# Patient Record
Sex: FEMALE | Race: WHITE | NOT HISPANIC OR LATINO | URBAN - METROPOLITAN AREA
[De-identification: names, ages, dates, MRNs, and addresses within clinical notes are randomized per-mention and may not be internally consistent; named-entity substitution may affect disease eponyms.]

---

## 2021-10-06 ENCOUNTER — EMERGENCY (EMERGENCY)
Facility: HOSPITAL | Age: 21
LOS: 0 days | Discharge: HOME | End: 2021-10-06
Attending: EMERGENCY MEDICINE
Payer: COMMERCIAL

## 2021-10-06 VITALS
HEART RATE: 108 BPM | TEMPERATURE: 98 F | SYSTOLIC BLOOD PRESSURE: 144 MMHG | OXYGEN SATURATION: 100 % | DIASTOLIC BLOOD PRESSURE: 95 MMHG | RESPIRATION RATE: 18 BRPM

## 2021-10-06 VITALS — WEIGHT: 139.99 LBS | HEIGHT: 69 IN

## 2021-10-06 DIAGNOSIS — R68.83 CHILLS (WITHOUT FEVER): ICD-10-CM

## 2021-10-06 DIAGNOSIS — U07.1 COVID-19: ICD-10-CM

## 2021-10-06 DIAGNOSIS — R68.89 OTHER GENERAL SYMPTOMS AND SIGNS: ICD-10-CM

## 2021-10-06 PROCEDURE — 99282 EMERGENCY DEPT VISIT SF MDM: CPT

## 2021-10-06 PROCEDURE — 99284 EMERGENCY DEPT VISIT MOD MDM: CPT

## 2021-10-06 NOTE — ED STATDOCS - PROGRESS NOTE DETAILS
22 yo female with a PMH of +covid last week presents with feeling cold and having a cold nose when she breaths in. Was seen at urgent care and sent in for further evaluation. -Rubin Stringer PA-C

## 2021-10-06 NOTE — ED STATDOCS - OBJECTIVE STATEMENT
22 y/o female with no significant PMHx presents to the ED c/o "feeling cold." Pt states the air coming in her nose and mouth are cold. Pt started with COVID symptoms 8 days ago and tested COVID + that day.  Pt starting to feel better. No CP, SOB. Due to cold feeling, pt was seen at urgent cart[facility] and was referred to the ED for evaluation. No other complaints at this time.

## 2021-10-06 NOTE — ED STATDOCS - ATTENDING CONTRIBUTION TO CARE
I,Brian Moreira MD,  performed the initial face to face bedside interview with this patient regarding history of present illness, review of symptoms and relevant past medical, social and family history.  I completed an independent physical examination.  I was the initial provider who evaluated this patient. I have signed out the follow up of any pending tests (i.e. labs, radiological studies) to the ACP.  I have communicated the patient’s plan of care and disposition with the ACP.  The history, relevant review of systems, past medical and surgical history, medical decision making, and physical examination was documented by the scribe in my presence and I attest to the accuracy of the documentation.

## 2021-10-06 NOTE — ED ADULT TRIAGE NOTE - CHIEF COMPLAINT QUOTE
pt presents to ED c/o "nose and throat coldness" x 2 days. pt + for Covid last Tuesday, had negative test at  this AM. was told by  to come to ED bc of "cold nose and throat".

## 2021-10-06 NOTE — ED STATDOCS - ENMT, MLM
Nasal mucosa clear.  Mouth with normal mucosa . No swelling in nose or mouth. Normal voice. Throat has no vesicles, no oropharyngeal exudates and uvula is midline.

## 2021-10-06 NOTE — ED STATDOCS - PATIENT PORTAL LINK FT
You can access the FollowMyHealth Patient Portal offered by [facility] by registering at the following website: [email or web address]. By joining "[employer]’s FollowMyHealth portal, you will also be able to view your health information using other applications (apps) compatible with our system.